# Patient Record
Sex: MALE | Race: WHITE | Employment: OTHER | URBAN - METROPOLITAN AREA
[De-identification: names, ages, dates, MRNs, and addresses within clinical notes are randomized per-mention and may not be internally consistent; named-entity substitution may affect disease eponyms.]

---

## 2023-01-28 ENCOUNTER — APPOINTMENT (OUTPATIENT)
Dept: CT IMAGING | Age: 68
End: 2023-01-28
Payer: COMMERCIAL

## 2023-01-28 ENCOUNTER — HOSPITAL ENCOUNTER (EMERGENCY)
Age: 68
Discharge: HOME | End: 2023-01-28
Attending: EMERGENCY MEDICINE
Payer: COMMERCIAL

## 2023-01-28 VITALS
RESPIRATION RATE: 16 BRPM | DIASTOLIC BLOOD PRESSURE: 96 MMHG | OXYGEN SATURATION: 98 % | SYSTOLIC BLOOD PRESSURE: 143 MMHG | HEART RATE: 102 BPM | TEMPERATURE: 98.1 F

## 2023-01-28 DIAGNOSIS — S16.1XXA STRAIN OF NECK MUSCLE, INITIAL ENCOUNTER: ICD-10-CM

## 2023-01-28 DIAGNOSIS — S00.03XA HEMATOMA OF SCALP, INITIAL ENCOUNTER: Primary | ICD-10-CM
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
## 2023-01-28 DIAGNOSIS — S09.90XA INJURY OF HEAD, INITIAL ENCOUNTER: ICD-10-CM

## 2023-01-28 DIAGNOSIS — S01.01XA LACERATION OF SCALP, INITIAL ENCOUNTER: ICD-10-CM

## 2023-01-28 PROCEDURE — 99284 EMERGENCY DEPT VISIT MOD MDM: CPT

## 2023-01-28 PROCEDURE — 72125 CT NECK SPINE W/O DYE: CPT

## 2023-01-28 PROCEDURE — 90715 TDAP VACCINE 7 YRS/> IM: CPT | Performed by: NURSE PRACTITIONER

## 2023-01-28 PROCEDURE — 70450 CT HEAD/BRAIN W/O DYE: CPT

## 2023-01-28 PROCEDURE — 6360000002 HC RX W HCPCS: Performed by: NURSE PRACTITIONER

## 2023-01-28 PROCEDURE — 12001 RPR S/N/AX/GEN/TRNK 2.5CM/<: CPT

## 2023-01-28 PROCEDURE — 90471 IMMUNIZATION ADMIN: CPT | Performed by: NURSE PRACTITIONER

## 2023-01-28 RX ORDER — AMLODIPINE BESYLATE 5 MG/1
5 TABLET ORAL DAILY
COMMUNITY

## 2023-01-28 RX ORDER — CYCLOBENZAPRINE HCL 5 MG
5 TABLET ORAL 2 TIMES DAILY PRN
Qty: 10 TABLET | Refills: 0 | Status: SHIPPED | OUTPATIENT
Start: 2023-01-28 | End: 2023-02-02

## 2023-01-28 RX ORDER — METOPROLOL SUCCINATE 25 MG/1
25 TABLET, EXTENDED RELEASE ORAL DAILY
COMMUNITY

## 2023-01-28 RX ORDER — ATORVASTATIN CALCIUM 20 MG/1
20 TABLET, FILM COATED ORAL DAILY
COMMUNITY

## 2023-01-28 RX ADMIN — TETANUS TOXOID, REDUCED DIPHTHERIA TOXOID AND ACELLULAR PERTUSSIS VACCINE, ADSORBED 0.5 ML: 5; 2.5; 8; 8; 2.5 SUSPENSION INTRAMUSCULAR at 13:09

## 2023-01-28 ASSESSMENT — ENCOUNTER SYMPTOMS
RESPIRATORY NEGATIVE: 1
GASTROINTESTINAL NEGATIVE: 1
BACK PAIN: 0
EYES NEGATIVE: 1

## 2023-01-28 ASSESSMENT — LIFESTYLE VARIABLES: HOW OFTEN DO YOU HAVE A DRINK CONTAINING ALCOHOL: MONTHLY OR LESS

## 2023-01-28 ASSESSMENT — PAIN - FUNCTIONAL ASSESSMENT: PAIN_FUNCTIONAL_ASSESSMENT: 0-10

## 2023-01-28 NOTE — ED NOTES
Sister given neuro check instructions to check on pt through tomorrow     Bartolome Christianson RN  01/28/23 7944

## 2023-01-28 NOTE — ED PROVIDER NOTES
[facility] Emergency Department  [id number] [address] [facility] [address] \[facility]\"" [address]  Phone: [phone]  Fax: [phone]        Pt Name: Adrian Brantley  MRN: [id number]  [place] 1955  Date of evaluation: 1/28/23    [address] [place] [address]       Chief Complaint   Patient presents with    Head Injury       HISTORY OF PRESENT ILLNESS (Location/Symptom, Timing/Onset, Context/Setting, Quality, Duration, Modifying Factors, Severity)      Adrian Brantley is a 75 y.o. male with no pertinent PMH who presents to the ED via private auto with complaints of a fall. Patient was outside when he was cleaning off the back of his truck, he slipped on ice fell back and hit his head on the blacktop. He states he did lose consciousness. He is unsure for how long. He states that he had some amnesia afterwards, when he finally realized it was going on, he had already driven his truck to another location on their property. He states he does not really have a headache. He states his neck does hurt a little bit. He does not have any numbness or tingling anywhere. Denies any loss of urine or stool. Denies any chest pain or shortness of breath. Has no rib pain or abdominal pain. No other back pain. No other symptoms at this time. Patient does not take any medication for his pain denies need for any currently. Unsure when his last tetanus immunization was. PAST MEDICAL / SURGICAL / SOCIAL / FAMILY HISTORY     PMH:  has a past medical history of A-fib ([facility] [address] Cancer of appendix ([facility] [address] Diabetes ([facility] [address] and Hyperlipemia. Surgical History:  has a past surgical history that includes hernia repair and Colon surgery. Social History:  reports that he has never smoked. He does not have any smokeless tobacco history on file. He reports that he does not use drugs. Family History: has no family status information on file. family history is not on file.   Psychiatric History: None    Allergies: Pcn [penicillins]    Home Medications:   Prior to Admission medications    Medication Sig Start Date End Date Taking? Authorizing Provider   amLODIPine (NORVASC) 5 MG tablet Take 5 mg by mouth daily   Yes Historical Provider, MD   atorvastatin (LIPITOR) 20 MG tablet Take 20 mg by mouth daily   Yes Historical Provider, MD   metFORMIN (GLUCOPHAGE) 500 MG tablet Take 500 mg by mouth 2 times daily (with meals)   Yes Historical Provider, MD   rivaroxaban (XARELTO) 20 MG TABS tablet Take 20 mg by mouth   Yes Historical Provider, MD   metoprolol succinate (TOPROL XL) 25 MG extended release tablet Take 25 mg by mouth daily   Yes Historical Provider, MD   cyclobenzaprine (FLEXERIL) 5 MG tablet Take 1 tablet by mouth 2 times daily as needed for Muscle spasms 1/28/23 2/2/23 Yes KYLEE Sanchez - NP       REVIEW OF SYSTEMS  (2-9 systems for level 4, 10 ormore for level 5)      Review of Systems   Constitutional: Negative. HENT: Negative. Eyes: Negative. Respiratory: Negative. Cardiovascular: Negative. Gastrointestinal: Negative. Endocrine: Negative. Genitourinary: Negative. Musculoskeletal:  Positive for neck pain. Negative for arthralgias, back pain, gait problem, joint swelling, myalgias and neck stiffness. Skin:         Laceration and swelling to the back of his head   Neurological: Negative. Hematological: Negative. Psychiatric/Behavioral: Negative. All other systems negative except as marked. PHYSICAL EXAM  (up to 7 for level 4, 8 or more for level 5)      INITIAL VITALS:  oral temperature is 98.1 °F (36.7 °C). His blood pressure is 143/96 (abnormal) and his pulse is 102 (abnormal). His respiration is 16 and oxygen saturation is 98%. Vital signs reviewed. Physical Exam  Constitutional:       Appearance: Normal appearance. HENT:      Head: Normocephalic.       Right Ear: Tympanic membrane, ear canal and external ear normal.      Left Ear: Tympanic membrane, ear canal and external ear normal. Nose: Nose normal.      Mouth/Throat:      Mouth: Mucous membranes are moist.      Pharynx: Oropharynx is clear. Eyes:      Extraocular Movements: Extraocular movements intact. Conjunctiva/sclera: Conjunctivae normal.      Pupils: Pupils are equal, round, and reactive to light. Cardiovascular:      Rate and Rhythm: Normal rate and regular rhythm. Pulses: Normal pulses. Heart sounds: Normal heart sounds. Pulmonary:      Effort: Pulmonary effort is normal.      Breath sounds: Normal breath sounds. Abdominal:      General: Bowel sounds are normal. There is no distension. Palpations: Abdomen is soft. Tenderness: There is no abdominal tenderness. There is no guarding. Musculoskeletal:         General: No swelling, tenderness or deformity. Normal range of motion. Cervical back: Normal range of motion. No rigidity. Lymphadenopathy:      Cervical: No cervical adenopathy. Skin:     General: Skin is warm and dry. Capillary Refill: Capillary refill takes less than 2 seconds. Neurological:      Mental Status: He is alert and oriented to person, place, and time. Sensory: No sensory deficit. Motor: No weakness. Coordination: Coordination normal.      Gait: Gait normal.   Psychiatric:         Mood and Affect: Mood normal.         Behavior: Behavior normal.         Thought Content: Thought content normal.         Judgment: Judgment normal.         DIFFERENTIAL DIAGNOSIS / MDM     On exam, patient is resting his room with his sister at bedside. He appears nontoxic and no distress is noted. Heart sounds normal limits upon auscultation. Lung sounds clear and equal bilaterally. Bowel sounds present in all 4 quadrants. No abdominal distention tenderness or guarding is noted. Pupils are equal round reactive light. EOM's intact. No sensory deficit is noted. Gait is steady. Patient denies any numbness or tingling anywhere.   Upper extremity strength equal bilaterally. Lower extremity strength equal bilaterally. Upon examination, patient does have a scalp hematoma in the occipital parietal region. There is a roughly 1 cm superficial laceration noted as well. The area was cleansed with soap and water as well as peroxide. There is also a small abrasion noted on the scalp hematoma as well. Dermabond was applied. The wound is well approximated. Patient tolerated the procedure well. She does have some tenderness in the cervical region, primarily over C5, more so on the left side, paraspinal in the musculature. No obvious deformity is noted. No bruising or swelling is noted. A c-collar was applied. Order a CT of the head as well as the cervical spine and update patient's tetanus. CT showing no acute intracranial abnormality, left occipital parietal scalp hematoma, multilevel cervical spondylosis and degenerative disc disease, no acute bony abnormalities noted in cervical spine per radiologist read. I discussed results with the patient. We will send home with prescription for Flexeril for cervical strain. Patient is encouraged to continue Tylenol as needed for pain control. Discussed head injury education. We also discussed how to care for a wound repaired with adhesives. He is to follow closely with his primary care physician. I did notice a bit of an ooze on the corner of the laceration, as well as the abrasion. I did apply pressure and applied a little bit more glue to the corner of the laceration. Bleeding has stopped. A dressing was applied. Monitor for signs of infection including increasing erythema, purulent discharge, any red streaking, significant increase in swelling, warmth to the touch, or any other new concerning or worsening symptoms. Patient states understanding of education to stable for outpatient follow-up at this time.       PLAN (LABS / IMAGING / EKG):  Orders Placed This Encounter   Procedures    CT HEAD WO CONTRAST    CT CERVICAL SPINE WO CONTRAST       MEDICATIONS ORDERED:  Orders Placed This Encounter   Medications    Tetanus-Diphth-Acell Pertussis (BOOSTRIX) injection 0.5 mL    cyclobenzaprine (FLEXERIL) 5 MG tablet     Sig: Take 1 tablet by mouth 2 times daily as needed for Muscle spasms     Dispense:  10 tablet     Refill:  0       Controlled Substances Monitoring:     DIAGNOSTIC RESULTS     EKG: All EKG's are interpreted by the Emergency Department Physician who either signs or Co-signs this chart in the [address] a cardiologist.      RADIOLOGY: All images are read by the radiologist and their interpretations are reviewed. CT HEAD WO CONTRAST   Final Result   No acute intracranial abnormality. Left occipital parietal scalp hematoma      Multilevel cervical spondylosis and degenerative disc disease. No acute bony abnormalities are noted in the cervical spine      RECOMMENDATIONS:   Further evaluation of the cervical spine should be obtained with MR imaging   if clinically indicated. CT CERVICAL SPINE WO CONTRAST   Final Result   No acute intracranial abnormality. Left occipital parietal scalp hematoma      Multilevel cervical spondylosis and degenerative disc disease. No acute bony abnormalities are noted in the cervical spine      RECOMMENDATIONS:   Further evaluation of the cervical spine should be obtained with MR imaging   if clinically indicated. No results found. LABS:  No results found for this visit on 01/28/23. EMERGENCY DEPARTMENT COURSE           Vitals:    Vitals:    01/28/23 1219 01/28/23 1352   BP: (!) 155/93 (!) 143/96   Pulse: (!) 101 (!) 102   Resp: 18 16   Temp: 98.1 °F (36.7 °C)    TempSrc: Oral    SpO2: 98% 98%     -------------------------  BP: (!) 143/96, Temp: 98.1 °F (36.7 °C), Heart Rate: (!) 102, Resp: 16      RE-EVALUATION:  See ED Course notes above. CONSULTS:  None    PROCEDURES:  None    FINAL IMPRESSION      1.  Hematoma of scalp, initial encounter    2. Laceration of scalp, initial encounter    3. Injury of head, initial encounter    4. Fall, initial encounter    5. Strain of neck muscle, initial encounter          DISPOSITION / PLAN     CONDITION ON DISPOSITION:   Good / Stable for discharge. PATIENT REFERRED TO:  [address] [facility] Emergency Department  [address]   [address] [id number] [phone]  Go to   If symptoms worsen    PCP    Schedule an appointment as soon as possible for a visit       DISCHARGE MEDICATIONS:  Discharge Medication List as of 1/28/2023  2:18 PM        START taking these medications    Details   cyclobenzaprine (FLEXERIL) 5 MG tablet Take 1 tablet by mouth 2 times daily as needed for Muscle spasms, Disp-10 tablet, R-0Normal             Brad Soulier, APRN - NP   Emergency Medicine Nurse Practitioner    (Please note that portions of this note were completed with a voice recognition program.  Efforts were made to edit the dictations but occasionally words aremis-transcribed.)      Brad Soulier, APRN - NP  01/28/23 6136

## 2023-01-28 NOTE — ED PROVIDER NOTES
Emergency Department           COMPLAINT       Chief Complaint   Patient presents with    Head Injury      PHYSICAL EXAM      ED Triage Vitals [01/28/23 1219]   BP Temp Temp Source Heart Rate Resp SpO2 Height Weight   (!) 155/93 98.1 °F (36.7 °C) Oral (!) 101 18 98 % -- --         Constitutional: Alert, oriented x3, nontoxic, answering questions appropriately, acting properly for age, in no acute distress   HEENT: Extraocular muscles intact, mucus membranes moist, no posterior pharyngeal erythema or exudates, Pupils equal, round, reactive to light, laceration scalp posterior  Neck: Trachea midline c-collar in place  Cardiovascular: Regular rhythm and rate no murmurs   Respiratory: Clear to auscultation bilaterally no wheezes, rhonchi, rales, no respiratory distress no tachypnea no retractions no hypoxia  Gastrointestinal: Soft, nontender, nondistended, positive bowel sounds. No rebound, rigidity, or guarding. Musculoskeletal: No extremity pain or swelling   Neurologic: Moving all 4 extremities without difficulty there are no gross focal neurologic deficits   Skin: Warm and dry       Physical Exam  DIAGNOSTIC RESULTS     EKG: All EKG's are interpreted by the Emergency Department Physician who either signs or Co-signs this chart in the absence of a cardiologist.    Not indicated unless otherwise documented above or in the midlevel documentation    LABS:  No results found for this visit on 01/28/23. Not indicated unless otherwise documented above or in the midlevel documentation    RADIOLOGY:   I reviewedthe radiologist interpretations:  CT HEAD WO CONTRAST   Final Result   No acute intracranial abnormality. Left occipital parietal scalp hematoma      Multilevel cervical spondylosis and degenerative disc disease.       No acute bony abnormalities are noted in the cervical spine      RECOMMENDATIONS:   Further evaluation of the cervical spine should be obtained with MR imaging   if clinically indicated. CT CERVICAL SPINE WO CONTRAST   Final Result   No acute intracranial abnormality. Left occipital parietal scalp hematoma      Multilevel cervical spondylosis and degenerative disc disease. No acute bony abnormalities are noted in the cervical spine      RECOMMENDATIONS:   Further evaluation of the cervical spine should be obtained with MR imaging   if clinically indicated. Not indicated unless otherwise documented above or in the midlevel documentation    EMERGENCY DEPARTMENT COURSE:     The patient was given the following medications:  Orders Placed This Encounter   Medications    Tetanus-Diphth-Acell Pertussis (BOOSTRIX) injection 0.5 mL        PERTINENT ATTENDING PHYSICIAN COMMENTS:      Slip and fall struck the back of his head with loss of consciousness and amnestic to the events. Cleaning stuff of his truck he slipped and fell. 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 Then his memory slowly started to come back to him. He is back to his baseline. He is on Xarelto. CT head. CT cervical spine. 2 PM CT of the head and cervical spine unremarkable. Will discharge to follow-up. No gross focal neurologic deficits. Return if worsening symptoms or any other concerns. Faculty Attestation    I performed a history and physical examination of the patient and discussed management with the mid level provideer. I reviewed the mid level provider's note and agree with the documented findings and plan of care. Any areas of disagreement are noted on the chart. I was personally present for the key portions of any procedures. I have documented in the chart those procedures where I was not present during the key portions. I have reviewed the emergency nurses triage note.  I agree with the chief complaint, past medical history, past surgical history, allergies, medications, social and family history as documented unless otherwise noted below. Documentation of the HPI, Physical Exam and Medical Decision Making performed by medical students or scribes is based on my personal performance of the HPI, PE and MDM. For Physician Assistant/ Nurse Practitioner cases/documentation I have personally evaluated this patient and have completed at least one if not all key elements of the E/M (history, physical exam, and MDM). Additional findings are as noted.        Jose L Bray,   01/28/23 2026